# Patient Record
Sex: MALE | Race: WHITE | Employment: OTHER | URBAN - METROPOLITAN AREA
[De-identification: names, ages, dates, MRNs, and addresses within clinical notes are randomized per-mention and may not be internally consistent; named-entity substitution may affect disease eponyms.]

---

## 2018-01-05 ENCOUNTER — RECORDS - HEALTHEAST (OUTPATIENT)
Dept: LAB | Facility: CLINIC | Age: 68
End: 2018-01-05

## 2018-01-05 LAB — VALPROATE SERPL-MCNC: 16.4 UG/ML (ref 50–150)

## 2018-09-24 ENCOUNTER — OFFICE VISIT (OUTPATIENT)
Dept: DERMATOLOGY | Facility: CLINIC | Age: 68
End: 2018-09-24
Payer: COMMERCIAL

## 2018-09-24 ENCOUNTER — RECORDS - HEALTHEAST (OUTPATIENT)
Dept: LAB | Facility: CLINIC | Age: 68
End: 2018-09-24

## 2018-09-24 VITALS — OXYGEN SATURATION: 95 % | SYSTOLIC BLOOD PRESSURE: 148 MMHG | DIASTOLIC BLOOD PRESSURE: 71 MMHG | HEART RATE: 65 BPM

## 2018-09-24 DIAGNOSIS — L28.0 LICHEN SIMPLEX CHRONICUS: Primary | ICD-10-CM

## 2018-09-24 LAB
ANION GAP SERPL CALCULATED.3IONS-SCNC: 5 MMOL/L (ref 5–18)
BUN SERPL-MCNC: 13 MG/DL (ref 8–22)
CALCIUM SERPL-MCNC: 8.9 MG/DL (ref 8.5–10.5)
CHLORIDE BLD-SCNC: 106 MMOL/L (ref 98–107)
CO2 SERPL-SCNC: 31 MMOL/L (ref 22–31)
CREAT SERPL-MCNC: 0.75 MG/DL (ref 0.7–1.3)
GFR SERPL CREATININE-BSD FRML MDRD: >60 ML/MIN/1.73M2
GLUCOSE BLD-MCNC: 73 MG/DL (ref 70–125)
POTASSIUM BLD-SCNC: 4.2 MMOL/L (ref 3.5–5)
SODIUM SERPL-SCNC: 142 MMOL/L (ref 136–145)

## 2018-09-24 PROCEDURE — 99213 OFFICE O/P EST LOW 20 MIN: CPT | Performed by: PHYSICIAN ASSISTANT

## 2018-09-24 RX ORDER — DONEPEZIL HYDROCHLORIDE 10 MG/1
10 TABLET, FILM COATED ORAL AT BEDTIME
COMMUNITY

## 2018-09-24 RX ORDER — LEVOTHYROXINE SODIUM 75 UG/1
CAPSULE ORAL
COMMUNITY

## 2018-09-24 RX ORDER — DIVALPROEX SODIUM 250 MG/1
250 TABLET, DELAYED RELEASE ORAL 3 TIMES DAILY
COMMUNITY

## 2018-09-24 RX ORDER — BETAMETHASONE DIPROPIONATE 0.5 MG/G
OINTMENT, AUGMENTED TOPICAL
Qty: 50 G | Refills: 3 | Status: SHIPPED | OUTPATIENT
Start: 2018-09-24

## 2018-09-24 NOTE — MR AVS SNAPSHOT
After Visit Summary   9/24/2018    Carlos Menjivar    MRN: [id number]           Patient Information     Date Of Birth          1950        Visit Information        Provider Department      9/24/2018 10:00 AM Eri Pineda PA-C [facility]        Today's Diagnoses     Lichen simplex chronicus    -  1       Follow-ups after your visit        Your next 10 appointments already scheduled     Nov 05, 2018  3:45 PM CST   Return Visit with Eri Pineda PA-C   [facility] ([facility]    [address] [facility] [address]   [phone]              Who to contact     If you have questions or need follow up information about today's clinic visit or your schedule please contact [facility] directly at [phone].  Normal or non-critical lab and imaging results will be communicated to you by MyChart, letter or phone within 4 business days after the clinic has received the results. If you do not hear from us within 7 days, please contact the clinic through MyChart or phone. If you have a critical or abnormal lab result, we will notify you by phone as soon as possible.  Submit refill requests through [employer] or call your pharmacy and they will forward the refill request to us. Please allow 3 business days for your refill to be completed.          Additional Information About Your Visit        Care EveryWhere ID     This is your Care EveryWhere ID. This could be used by other organizations to access your [facility] medical records  [id number]        Your Vitals Were     Pulse Pulse Oximetry                65 95%           Blood Pressure from Last 3 Encounters:   09/24/18 148/71    Weight from Last 3 Encounters:   No data found for Wt              Today, you had the following     No orders found for display         Today's Medication Changes          These changes are accurate as of 9/24/18 10:33 AM.  If you have any questions, ask  your nurse or doctor.               Start taking these medicines.        Dose/Directions    augmented betamethasone dipropionate 0.05 % ointment   Commonly known as:  DIPROLENE-AF   Used for:  Lichen simplex chronicus   Started by:  Eri Pineda PA-C        Apply to AA BID x 6 weeks   Quantity:  50 g   Refills:  3            Where to get your medicines      These medications were sent to [id number] - [place], [place] - [address]  [address], [facility] [address]     Phone:  [phone]     augmented betamethasone dipropionate 0.05 % ointment                Primary Care Provider Office Phone # Fax #    Barry Kowalski [phone]       [facility] [address]        Equal Access to Services     TALIB SUMMERS : Hadii aad ku hadasho Soomaali, waaxda luqadaha, qaybta kaalmada adeegyada, jean barrow. So [facility] [phone].    ATENCIÓN: Si habla español, tiene a schaefer disposición servicios gratuitos de asistencia lingüística. Llame al [phone].    We comply with applicable federal civil rights laws and [place] laws. We do not discriminate on the basis of race, color, national origin, age, disability, sex, sexual orientation, or gender identity.            Thank you!     Thank you for choosing [facility]  for your care. Our goal is always to provide you with excellent care. Hearing back from our patients is one way we can continue to improve our services. Please take a few minutes to complete the written survey that you may receive in the mail after your visit with us. Thank you!             Your Updated Medication List - Protect others around you: Learn how to safely use, store and throw away your medicines at www.disposemymeds.org.          This list is accurate as of 9/24/18 10:33 AM.  Always use your most recent med list.                   Brand Name Dispense Instructions for use  Diagnosis    ARICEPT 10 MG tablet   Generic drug:  donepezil      Take 10 mg by mouth At Bedtime        augmented betamethasone dipropionate 0.05 % ointment    DIPROLENE-AF    50 g    Apply to AA BID x 6 weeks    Lichen simplex chronicus       divalproex sodium delayed-release 250 MG DR tablet    DEPAKOTE     Take 250 mg by mouth 3 times daily        EFFEXOR XR PO      Take 150 mg by mouth        Levothyroxine Sodium 75 MCG Caps           LISINOPRIL PO      Take 10 mg by mouth        MIRTAZAPINE PO      Take 30 mg by mouth At Bedtime

## 2018-09-24 NOTE — LETTER
9/24/2018         RE: Carlos Menjivar  [address] [address]        Dear Colleague,    Thank you for referring your patient, Carlos Menjivar, to the [facility]. Please see a copy of my visit note below.    HPI:   Carlos Menjivar is a 68 year old male who presents for evaluation of a spot behind the ear that has been itchy and irritated.   chief complaint  Location: behind the right year near tattoo   Condition present for:  Years. He had the tattoo long before the itching and irritation started   Previous treatments include: none  -does admit to frequently rubbing and itching the area    Review Of Systems  Eyes: negative  Ears/Nose/Throat: negative  Respiratory: No shortness of breath, dyspnea on exertion, cough, or hemoptysis  Cardiovascular: negative  Gastrointestinal: negative  Genitourinary: negative  Musculoskeletal: negative  Neurologic: negative  Psychiatric: negative        PHYSICAL EXAM:    /71  Pulse 65  SpO2 95%  Skin exam performed as follows: Type 2 skin. Mood appropriate  Alert and Oriented X 3. Well developed, well nourished in no distress.  General appearance: Normal  Head including face: Normal  Eyes: conjunctiva and lids: Normal  Mouth: Lips, teeth, gums: Normal  Neck: Normal  Chest-breast/axillae: Normal  Back: Normal  Spleen and liver: Normal  Cardiovascular: Exam of peripheral vascular system by observation for swelling, varicosities, edema: Normal  Genitalia: groin, buttocks: Normal  Extremities: digits/nails (clubbing): Normal  Eccrine and Apocrine glands: Normal  Right upper extremity: Normal  Left upper extremity: Normal  Right lower extremity: Normal  Left lower extremity: Normal  Skin: Scalp and body hair: See below    1. Lichenification on the right postauricular neck approx 10 x 3 cm in size    ASSESSMENT/PLAN:     1. Favor Lichen simplex chronicus over allergic reaction to tattoo ink on the right post auricular neck - advised on diagnosis  and treatment options. He does rub and scratch the area very frequently. Discussed being as 'hands off' as possible - recommend keeping area covered with a large bandage.   --Start betamethasone ointment BID x 6 weeks        Follow-up: 6 weeks  CC:   Scribed By: Eri Pineda, MS, PA-C      Again, thank you for allowing me to participate in the care of your patient.        Sincerely,        Eri Pineda PA-C

## 2018-09-24 NOTE — PROGRESS NOTES
HPI:   Carlos Menjivar is a 68 year old male who presents for evaluation of a spot behind the ear that has been itchy and irritated.   chief complaint  Location: behind the right year near tattoo   Condition present for:  Years. He had the tattoo long before the itching and irritation started   Previous treatments include: none  -does admit to frequently rubbing and itching the area    Review Of Systems  Eyes: negative  Ears/Nose/Throat: negative  Respiratory: No shortness of breath, dyspnea on exertion, cough, or hemoptysis  Cardiovascular: negative  Gastrointestinal: negative  Genitourinary: negative  Musculoskeletal: negative  Neurologic: negative  Psychiatric: negative        PHYSICAL EXAM:    /71  Pulse 65  SpO2 95%  Skin exam performed as follows: Type 2 skin. Mood appropriate  Alert and Oriented X 3. Well developed, well nourished in no distress.  General appearance: Normal  Head including face: Normal  Eyes: conjunctiva and lids: Normal  Mouth: Lips, teeth, gums: Normal  Neck: Normal  Chest-breast/axillae: Normal  Back: Normal  Spleen and liver: Normal  Cardiovascular: Exam of peripheral vascular system by observation for swelling, varicosities, edema: Normal  Genitalia: groin, buttocks: Normal  Extremities: digits/nails (clubbing): Normal  Eccrine and Apocrine glands: Normal  Right upper extremity: Normal  Left upper extremity: Normal  Right lower extremity: Normal  Left lower extremity: Normal  Skin: Scalp and body hair: See below    1. Lichenification on the right postauricular neck approx 10 x 3 cm in size    ASSESSMENT/PLAN:     1. Favor Lichen simplex chronicus over allergic reaction to tattoo ink on the right post auricular neck - advised on diagnosis and treatment options. He does rub and scratch the area very frequently. Discussed being as 'hands off' as possible - recommend keeping area covered with a large bandage.   --Start betamethasone ointment BID x 6 weeks        Follow-up: 6  weeks  CC:   Scribed By: Eri Pineda, MS, PA-C

## 2018-11-02 ENCOUNTER — RECORDS - HEALTHEAST (OUTPATIENT)
Dept: LAB | Facility: CLINIC | Age: 68
End: 2018-11-02

## 2018-11-05 LAB
ALT SERPL W P-5'-P-CCNC: 19 U/L (ref 0–45)
ANION GAP SERPL CALCULATED.3IONS-SCNC: 8 MMOL/L (ref 5–18)
AST SERPL W P-5'-P-CCNC: 23 U/L (ref 0–40)
BUN SERPL-MCNC: 14 MG/DL (ref 8–22)
CALCIUM SERPL-MCNC: 9.1 MG/DL (ref 8.5–10.5)
CHLORIDE BLD-SCNC: 104 MMOL/L (ref 98–107)
CO2 SERPL-SCNC: 30 MMOL/L (ref 22–31)
CREAT SERPL-MCNC: 0.82 MG/DL (ref 0.7–1.3)
ERYTHROCYTE [DISTWIDTH] IN BLOOD BY AUTOMATED COUNT: 13 % (ref 11–14.5)
GFR SERPL CREATININE-BSD FRML MDRD: >60 ML/MIN/1.73M2
GLUCOSE BLD-MCNC: 74 MG/DL (ref 70–125)
HCT VFR BLD AUTO: 46.3 % (ref 40–54)
HGB BLD-MCNC: 14.7 G/DL (ref 14–18)
MCH RBC QN AUTO: 31.5 PG (ref 27–34)
MCHC RBC AUTO-ENTMCNC: 31.7 G/DL (ref 32–36)
MCV RBC AUTO: 99 FL (ref 80–100)
PLATELET # BLD AUTO: 226 THOU/UL (ref 140–440)
PMV BLD AUTO: 11.6 FL (ref 8.5–12.5)
POTASSIUM BLD-SCNC: 3.5 MMOL/L (ref 3.5–5)
RBC # BLD AUTO: 4.66 MILL/UL (ref 4.4–6.2)
SODIUM SERPL-SCNC: 142 MMOL/L (ref 136–145)
TSH SERPL DL<=0.005 MIU/L-ACNC: 2.91 UIU/ML (ref 0.3–5)
VALPROATE SERPL-MCNC: 20.5 UG/ML (ref 50–150)
WBC: 9.1 THOU/UL (ref 4–11)